# Patient Record
Sex: FEMALE | URBAN - METROPOLITAN AREA
[De-identification: names, ages, dates, MRNs, and addresses within clinical notes are randomized per-mention and may not be internally consistent; named-entity substitution may affect disease eponyms.]

---

## 2024-11-21 ENCOUNTER — APPOINTMENT (RX ONLY)
Dept: URBAN - METROPOLITAN AREA CLINIC 75 | Facility: CLINIC | Age: 44
Setting detail: DERMATOLOGY
End: 2024-11-21

## 2024-11-21 DIAGNOSIS — L259 CONTACT DERMATITIS AND OTHER ECZEMA, UNSPECIFIED CAUSE: ICD-10-CM

## 2024-11-21 PROBLEM — L30.8 OTHER SPECIFIED DERMATITIS: Status: ACTIVE | Noted: 2024-11-21

## 2024-11-21 PROCEDURE — ? PRESCRIPTION MEDICATION MANAGEMENT

## 2024-11-21 PROCEDURE — ? COUNSELING

## 2024-11-21 PROCEDURE — ? CHRONOLOGY OF PRESENT ILLNESS

## 2024-11-21 PROCEDURE — ? PRESCRIPTION

## 2024-11-21 PROCEDURE — 99203 OFFICE O/P NEW LOW 30 MIN: CPT

## 2024-11-21 RX ORDER — TRIAMCINOLONE ACETONIDE 1 MG/G
THIN LAYER CREAM TOPICAL BID
Qty: 80 | Refills: 1 | Status: ERX | COMMUNITY
Start: 2024-11-21

## 2024-11-21 RX ADMIN — TRIAMCINOLONE ACETONIDE THIN LAYER: 1 CREAM TOPICAL at 00:00

## 2024-11-21 ASSESSMENT — LOCATION DETAILED DESCRIPTION DERM
LOCATION DETAILED: LEFT AXILLARY VAULT
LOCATION DETAILED: LEFT PROXIMAL PRETIBIAL REGION
LOCATION DETAILED: UPPER STERNUM
LOCATION DETAILED: RIGHT AXILLARY VAULT
LOCATION DETAILED: RIGHT PROXIMAL PRETIBIAL REGION

## 2024-11-21 ASSESSMENT — LOCATION ZONE DERM
LOCATION ZONE: AXILLAE
LOCATION ZONE: LEG
LOCATION ZONE: TRUNK

## 2024-11-21 ASSESSMENT — ITCH NUMERIC RATING SCALE: HOW SEVERE IS YOUR ITCHING?: 8

## 2024-11-21 ASSESSMENT — LOCATION SIMPLE DESCRIPTION DERM
LOCATION SIMPLE: RIGHT AXILLARY VAULT
LOCATION SIMPLE: RIGHT PRETIBIAL REGION
LOCATION SIMPLE: LEFT PRETIBIAL REGION
LOCATION SIMPLE: CHEST
LOCATION SIMPLE: LEFT AXILLARY VAULT

## 2024-11-21 ASSESSMENT — SEVERITY ASSESSMENT 2020: SEVERITY 2020: MILD

## 2024-11-21 ASSESSMENT — PAIN INTENSITY VAS: HOW INTENSE IS YOUR PAIN 0 BEING NO PAIN, 10 BEING THE MOST SEVERE PAIN POSSIBLE?: NO PAIN

## 2024-11-21 ASSESSMENT — BSA RASH: BSA RASH: 20

## 2024-11-21 NOTE — PROCEDURE: CHRONOLOGY OF PRESENT ILLNESS
Chronology Of Present Illness: 11/21/24\\nPatient reports new, pruritic skin eruption to chest, shins, b/l axilla x 1 month. She has been using OTC hydrocortisone with no improvement. She denies any new medications, skin care products, vitamins or supplements. No recent illness or travel. Patient denies personal and family history of eczema, asthma, seasonal allergies, or psoriasis. Very minimal skin eruption present on exam today. Will treat with trial of triamcinolone. Patient to f/u in 2 weeks.
Detail Level: Zone

## 2024-11-21 NOTE — PROCEDURE: PRESCRIPTION MEDICATION MANAGEMENT
Plan: triamcinolone acetonide 0.1 % topical cream- Apply a thin layer to AA of underarms and legs BID for up to 2 weeks only\\nMaintain skin barrier: -Diligent emollient use twice daily with gentle, unscented moistuizer- Watch for triggers. Avoid irritants. -Wash with gentle, unscented soap to face, underarms, groin, and feet only \\nRecommended Eucerin, aveeno, Cetaphil
Detail Level: Zone
Render In Strict Bullet Format?: No

## 2025-05-15 ENCOUNTER — APPOINTMENT (OUTPATIENT)
Dept: URBAN - METROPOLITAN AREA CLINIC 75 | Facility: CLINIC | Age: 45
Setting detail: DERMATOLOGY
End: 2025-05-15

## 2025-05-15 DIAGNOSIS — L259 CONTACT DERMATITIS AND OTHER ECZEMA, UNSPECIFIED CAUSE: ICD-10-CM

## 2025-05-15 PROBLEM — L30.8 OTHER SPECIFIED DERMATITIS: Status: ACTIVE | Noted: 2025-05-15

## 2025-05-15 PROCEDURE — ? PRESCRIPTION MEDICATION MANAGEMENT

## 2025-05-15 PROCEDURE — ? CHRONOLOGY OF PRESENT ILLNESS

## 2025-05-15 PROCEDURE — 99213 OFFICE O/P EST LOW 20 MIN: CPT

## 2025-05-15 PROCEDURE — ? COUNSELING

## 2025-05-15 ASSESSMENT — LOCATION SIMPLE DESCRIPTION DERM: LOCATION SIMPLE: LEFT PRETIBIAL REGION

## 2025-05-15 ASSESSMENT — SEVERITY ASSESSMENT 2020: SEVERITY 2020: MILD

## 2025-05-15 ASSESSMENT — PAIN INTENSITY VAS: HOW INTENSE IS YOUR PAIN 0 BEING NO PAIN, 10 BEING THE MOST SEVERE PAIN POSSIBLE?: NO PAIN

## 2025-05-15 ASSESSMENT — LOCATION ZONE DERM: LOCATION ZONE: LEG

## 2025-05-15 ASSESSMENT — LOCATION DETAILED DESCRIPTION DERM: LOCATION DETAILED: LEFT PROXIMAL PRETIBIAL REGION

## 2025-05-15 ASSESSMENT — ITCH NUMERIC RATING SCALE: HOW SEVERE IS YOUR ITCHING?: 3

## 2025-05-15 ASSESSMENT — BSA RASH: BSA RASH: 2

## 2025-05-15 NOTE — PROCEDURE: PRESCRIPTION MEDICATION MANAGEMENT
Plan: Zoryve 0.3% cream- Apply thin layer to AA of leg QD x1 month\\n\\nMaintain skin barrier:\\n-Diligent emollient use twice daily with gentle, unscented moistuizer\\n-Watch for triggers. Avoid irritants.\\n-Wash with gentle, unscented soap to face, underarms, groin, and feet only
Samples Given: Zoryve 0.3%
Detail Level: Zone
Render In Strict Bullet Format?: No

## 2025-05-15 NOTE — PROCEDURE: CHRONOLOGY OF PRESENT ILLNESS
Chronology Of Present Illness: 11/21/24\\nPatient reports new, pruritic skin eruption to chest, shins, b/l axilla x 1 month. She has been using OTC hydrocortisone with no improvement. She denies any new medications, skin care products, vitamins or supplements. No recent illness or travel. Patient denies personal and family history of eczema, asthma, seasonal allergies, or psoriasis. Very minimal skin eruption present on exam today. Will treat with trial of triamcinolone. Patient to f/u in 2 weeks.\\n\\n5/15/25\\nRash persists on left shin. Triamcinolone only provides temporary relief. Patient concerned that steroids will thin skin, so she prefers a non-steroidal topical. Will give samples of Zoryve 0.3% for use QD x1 month. Recommended diligent emollient use and limiting shaving. Photos taken today. Patient to f/u in 1 month.
Detail Level: Zone

## 2025-06-16 ENCOUNTER — APPOINTMENT (OUTPATIENT)
Dept: URBAN - METROPOLITAN AREA CLINIC 75 | Facility: CLINIC | Age: 45
Setting detail: DERMATOLOGY
End: 2025-06-16

## 2025-06-16 DIAGNOSIS — L20.89 OTHER ATOPIC DERMATITIS: ICD-10-CM

## 2025-06-16 PROCEDURE — ? PRESCRIPTION

## 2025-06-16 PROCEDURE — ? COUNSELING

## 2025-06-16 PROCEDURE — ? PRESCRIPTION MEDICATION MANAGEMENT

## 2025-06-16 PROCEDURE — ?

## 2025-06-16 PROCEDURE — ? CHRONOLOGY OF PRESENT ILLNESS

## 2025-06-16 RX ORDER — ROFLUMILAST 1.5 MG/G
THIN LAYER CREAM TOPICAL QD
Qty: 60 | Refills: 2 | Status: ERX | COMMUNITY
Start: 2025-06-16

## 2025-06-16 RX ADMIN — ROFLUMILAST THIN LAYER: 1.5 CREAM TOPICAL at 00:00

## 2025-06-16 ASSESSMENT — LOCATION ZONE DERM
LOCATION ZONE: LEG
LOCATION ZONE: LEG

## 2025-06-16 ASSESSMENT — LOCATION SIMPLE DESCRIPTION DERM
LOCATION SIMPLE: LEFT PRETIBIAL REGION
LOCATION SIMPLE: LEFT KNEE
LOCATION SIMPLE: LEFT KNEE
LOCATION SIMPLE: LEFT PRETIBIAL REGION

## 2025-06-16 ASSESSMENT — LOCATION DETAILED DESCRIPTION DERM
LOCATION DETAILED: LEFT PROXIMAL PRETIBIAL REGION
LOCATION DETAILED: LEFT PROXIMAL PRETIBIAL REGION
LOCATION DETAILED: LEFT KNEE
LOCATION DETAILED: LEFT KNEE

## 2025-06-16 ASSESSMENT — BSA RASH: BSA RASH: 2

## 2025-06-16 NOTE — PROCEDURE: CHRONOLOGY OF PRESENT ILLNESS
Chronology Of Present Illness: 11/21/24\\nPatient reports new, pruritic skin eruption to chest, shins, b/l axilla x 1 month. She has been using OTC hydrocortisone with no improvement. She denies any new medications, skin care products, vitamins or supplements. No recent illness or travel. Patient denies personal and family history of eczema, asthma, seasonal allergies, or psoriasis. Very minimal skin eruption present on exam today. Will treat with trial of triamcinolone. Patient to f/u in 2 weeks.\\n\\n5/15/25\\nRash persists on left shin. Triamcinolone only provides temporary relief. Patient concerned that steroids will thin skin, so she prefers a non-steroidal topical. Will give samples of Zoryve 0.15% for use QD x1 month. Recommended diligent emollient use and limiting shaving. Photos taken today. Patient to f/u in 1 month.\\n\\n6/16/25\\nEssentially clear today but patient continues to c/o pruritus. One small sample of Zoryve 0.15% has lasted a month. Recommend that she use more product/fill rx. Will use for one additional month  and F/U in 1 month.
Detail Level: Detailed

## 2025-06-16 NOTE — PROCEDURE: PRESCRIPTION MEDICATION MANAGEMENT
Detail Level: Zone
Plan: Zoryve 0.15 % topical cream QD - Apply thin layer to AA of leg QD
Render In Strict Bullet Format?: No
Samples Given: Zoryve 0.15%

## 2025-07-16 ENCOUNTER — APPOINTMENT (OUTPATIENT)
Dept: URBAN - METROPOLITAN AREA CLINIC 75 | Facility: CLINIC | Age: 45
Setting detail: DERMATOLOGY
End: 2025-07-16

## 2025-07-16 DIAGNOSIS — L20.89 OTHER ATOPIC DERMATITIS: ICD-10-CM

## 2025-07-16 PROCEDURE — ? CHRONOLOGY OF PRESENT ILLNESS

## 2025-07-16 PROCEDURE — ? COUNSELING

## 2025-07-16 PROCEDURE — ? PRESCRIPTION MEDICATION MANAGEMENT

## 2025-07-16 PROCEDURE — ?

## 2025-07-16 PROCEDURE — ? PRESCRIPTION

## 2025-07-16 RX ORDER — CLOBETASOL PROPIONATE 0.5 MG/G
THIN LAYER CREAM TOPICAL BID
Qty: 30 | Refills: 0 | Status: ERX | COMMUNITY
Start: 2025-07-16

## 2025-07-16 RX ADMIN — CLOBETASOL PROPIONATE THIN LAYER: 0.5 CREAM TOPICAL at 00:00

## 2025-07-16 ASSESSMENT — LOCATION ZONE DERM
LOCATION ZONE: LEG
LOCATION ZONE: LEG

## 2025-07-16 ASSESSMENT — ITCH NUMERIC RATING SCALE: HOW SEVERE IS YOUR ITCHING?: 8

## 2025-07-16 ASSESSMENT — LOCATION SIMPLE DESCRIPTION DERM
LOCATION SIMPLE: LEFT KNEE
LOCATION SIMPLE: LEFT PRETIBIAL REGION
LOCATION SIMPLE: LEFT PRETIBIAL REGION
LOCATION SIMPLE: LEFT KNEE

## 2025-07-16 ASSESSMENT — SEVERITY ASSESSMENT 2020: SEVERITY 2020: MILD

## 2025-07-16 ASSESSMENT — LOCATION DETAILED DESCRIPTION DERM
LOCATION DETAILED: LEFT PROXIMAL PRETIBIAL REGION
LOCATION DETAILED: LEFT KNEE
LOCATION DETAILED: LEFT PROXIMAL PRETIBIAL REGION
LOCATION DETAILED: LEFT KNEE

## 2025-07-16 ASSESSMENT — BSA RASH: BSA RASH: 3

## 2025-07-16 NOTE — PROCEDURE: CHRONOLOGY OF PRESENT ILLNESS
Chronology Of Present Illness: 11/21/24\\nPatient reports new, pruritic skin eruption to chest, shins, b/l axilla x 1 month. She has been using OTC hydrocortisone with no improvement. She denies any new medications, skin care products, vitamins or supplements. No recent illness or travel. Patient denies personal and family history of eczema, asthma, seasonal allergies, or psoriasis. Very minimal skin eruption present on exam today. Will treat with trial of triamcinolone. Patient to f/u in 2 weeks.\\n\\n5/15/25\\nRash persists on left shin. Triamcinolone only provides temporary relief. Patient concerned that steroids will thin skin, so she prefers a non-steroidal topical. Will give samples of Zoryve 0.15% for use QD x1 month. Recommended diligent emollient use and limiting shaving. Photos taken today. Patient to f/u in 1 month.\\n\\n6/16/25\\nEssentially clear today but patient continues to c/o pruritus. One small sample of Zoryve 0.15% has lasted a month. Recommend that she use more product/fill rx. Will use for one additional month  and F/U in 1 month.\\n\\n7/16/2025\\nPruritis still persists on left shin. Pt notes that zoryve, triamcinolone, and the CeraVe anti-itch lotion have not helped. Will send clobetasol 0.05% cream. Photo taken today. F/u in 1 month.
Detail Level: Detailed